# Patient Record
Sex: MALE
[De-identification: names, ages, dates, MRNs, and addresses within clinical notes are randomized per-mention and may not be internally consistent; named-entity substitution may affect disease eponyms.]

---
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
## 2023-11-22 ENCOUNTER — OFFICE (OUTPATIENT)
Dept: URBAN - METROPOLITAN AREA CLINIC 104 | Facility: CLINIC | Age: 18
Setting detail: OPHTHALMOLOGY
End: 2023-11-22
Payer: COMMERCIAL

## 2023-11-22 DIAGNOSIS — Q10.3: ICD-10-CM

## 2023-11-22 DIAGNOSIS — H47.092: ICD-10-CM

## 2023-11-22 DIAGNOSIS — H01.002: ICD-10-CM

## 2023-11-22 DIAGNOSIS — H01.005: ICD-10-CM

## 2023-11-22 PROCEDURE — 92014 COMPRE OPH EXAM EST PT 1/>: CPT | Performed by: OPTOMETRIST

## 2023-11-22 ASSESSMENT — CONFRONTATIONAL VISUAL FIELD TEST (CVF)
OS_FINDINGS: FULL
OD_FINDINGS: FULL

## 2023-11-22 ASSESSMENT — SPHEQUIV_DERIVED
OD_SPHEQUIV: -2
OS_SPHEQUIV: -1.875

## 2023-11-22 ASSESSMENT — REFRACTION_AUTOREFRACTION
OD_AXIS: 177
OS_AXIS: 168
OS_SPHERE: -1.75
OS_CYLINDER: -0.25
OD_CYLINDER: -0.50
OD_SPHERE: -1.75

## 2023-11-22 ASSESSMENT — LID EXAM ASSESSMENTS
OS_BLEPHARITIS: LLL T
OD_BLEPHARITIS: RLL T

## 2023-11-22 ASSESSMENT — REFRACTION_CURRENTRX
OS_SPHERE: -1.50
OD_SPHERE: -1.50
OD_CYLINDER: SPH
OS_CYLINDER: SPH
OS_OVR_VA: 20/
OD_OVR_VA: 20/

## 2024-11-27 ENCOUNTER — OFFICE (OUTPATIENT)
Dept: URBAN - METROPOLITAN AREA CLINIC 104 | Facility: CLINIC | Age: 19
Setting detail: OPHTHALMOLOGY
End: 2024-11-27
Payer: COMMERCIAL

## 2024-11-27 DIAGNOSIS — H01.002: ICD-10-CM

## 2024-11-27 DIAGNOSIS — H01.005: ICD-10-CM

## 2024-11-27 DIAGNOSIS — H47.092: ICD-10-CM

## 2024-11-27 PROCEDURE — 92014 COMPRE OPH EXAM EST PT 1/>: CPT | Performed by: OPTOMETRIST

## 2024-11-27 ASSESSMENT — REFRACTION_AUTOREFRACTION
OD_SPHERE: -1.50
OS_CYLINDER: -0.25
OS_AXIS: 14
OS_SPHERE: -1.50
OD_AXIS: 162
OD_CYLINDER: -0.50

## 2024-11-27 ASSESSMENT — CONFRONTATIONAL VISUAL FIELD TEST (CVF)
OS_FINDINGS: FULL
OD_FINDINGS: FULL

## 2024-11-27 ASSESSMENT — LID EXAM ASSESSMENTS
OD_BLEPHARITIS: RLL T
OS_BLEPHARITIS: LLL T

## 2024-11-27 ASSESSMENT — VISUAL ACUITY
OS_BCVA: 20/20
OD_BCVA: 20/20

## 2024-11-27 ASSESSMENT — KERATOMETRY
OS_K2POWER_DIOPTERS: 46.11
OD_K2POWER_DIOPTERS: 46.30
OS_AXISANGLE_DEGREES: 081
OD_AXISANGLE_DEGREES: 095
OD_K1POWER_DIOPTERS: 45.18
OS_K1POWER_DIOPTERS: 44.82

## 2024-11-27 ASSESSMENT — REFRACTION_CURRENTRX
OS_CYLINDER: SPH
OD_OVR_VA: 20/
OS_OVR_VA: 20/
OD_SPHERE: -1.50
OD_CYLINDER: SPH
OS_SPHERE: -1.50